# Patient Record
Sex: FEMALE | Race: WHITE | Employment: FULL TIME | URBAN - METROPOLITAN AREA
[De-identification: names, ages, dates, MRNs, and addresses within clinical notes are randomized per-mention and may not be internally consistent; named-entity substitution may affect disease eponyms.]

---

## 2017-02-03 PROBLEM — H69.83 ETD (EUSTACHIAN TUBE DYSFUNCTION), BILATERAL: Status: ACTIVE | Noted: 2017-02-03

## 2017-02-03 PROBLEM — B02.9 HERPES ZOSTER WITHOUT COMPLICATION: Status: ACTIVE | Noted: 2017-02-03

## 2017-02-03 PROBLEM — H69.93 ETD (EUSTACHIAN TUBE DYSFUNCTION), BILATERAL: Status: ACTIVE | Noted: 2017-02-03

## 2017-08-28 ENCOUNTER — HOSPITAL ENCOUNTER (OUTPATIENT)
Dept: MAMMOGRAPHY | Age: 57
Discharge: HOME | End: 2017-08-28
Attending: OBSTETRICS & GYNECOLOGY
Payer: COMMERCIAL

## 2017-08-28 DIAGNOSIS — Z12.39 ENCOUNTER FOR SCREENING FOR MALIGNANT NEOPLASM OF BREAST: ICD-10-CM

## 2017-08-28 PROCEDURE — 77067 SCR MAMMO BI INCL CAD: CPT | Performed by: OBSTETRICS & GYNECOLOGY

## 2019-01-05 ENCOUNTER — HOSPITAL ENCOUNTER (OUTPATIENT)
Dept: MAMMOGRAPHY | Age: 59
Discharge: HOME | End: 2019-01-05
Attending: OBSTETRICS & GYNECOLOGY
Payer: COMMERCIAL

## 2019-01-05 DIAGNOSIS — Z13.9 VISIT FOR SCREENING: ICD-10-CM

## 2019-01-05 PROCEDURE — 77063 BREAST TOMOSYNTHESIS BI: CPT | Performed by: OBSTETRICS & GYNECOLOGY

## 2019-01-05 PROCEDURE — 77067 SCR MAMMO BI INCL CAD: CPT | Performed by: OBSTETRICS & GYNECOLOGY

## 2020-02-27 ENCOUNTER — HOSPITAL ENCOUNTER (OUTPATIENT)
Dept: MAMMOGRAPHY | Age: 60
Discharge: HOME | End: 2020-02-27
Attending: OBSTETRICS & GYNECOLOGY
Payer: COMMERCIAL

## 2020-02-27 DIAGNOSIS — Z12.31 ENCOUNTER FOR SCREENING MAMMOGRAM FOR MALIGNANT NEOPLASM OF BREAST: ICD-10-CM

## 2020-02-27 PROCEDURE — 77067 SCR MAMMO BI INCL CAD: CPT | Performed by: OBSTETRICS & GYNECOLOGY

## 2020-02-27 PROCEDURE — 77063 BREAST TOMOSYNTHESIS BI: CPT | Performed by: OBSTETRICS & GYNECOLOGY

## 2020-10-26 PROBLEM — M85.80 OSTEOPENIA, UNSPECIFIED LOCATION: Status: ACTIVE | Noted: 2020-10-26

## 2021-03-30 ENCOUNTER — HOSPITAL ENCOUNTER (OUTPATIENT)
Dept: MAMMOGRAPHY | Age: 61
Discharge: HOME | End: 2021-03-30
Attending: PHYSICIAN ASSISTANT
Payer: COMMERCIAL

## 2021-03-30 DIAGNOSIS — Z12.31 ENCOUNTER FOR SCREENING MAMMOGRAM FOR MALIGNANT NEOPLASM OF BREAST: ICD-10-CM

## 2021-03-30 PROCEDURE — 77067 SCR MAMMO BI INCL CAD: CPT | Performed by: PHYSICIAN ASSISTANT

## 2021-03-30 PROCEDURE — 77063 BREAST TOMOSYNTHESIS BI: CPT | Performed by: PHYSICIAN ASSISTANT

## 2022-12-28 ENCOUNTER — OFFICE VISIT (OUTPATIENT)
Dept: INTERNAL MEDICINE CLINIC | Facility: CLINIC | Age: 62
End: 2022-12-28
Payer: COMMERCIAL

## 2022-12-28 VITALS
SYSTOLIC BLOOD PRESSURE: 152 MMHG | TEMPERATURE: 99 F | DIASTOLIC BLOOD PRESSURE: 88 MMHG | WEIGHT: 166 LBS | BODY MASS INDEX: 28.34 KG/M2 | HEART RATE: 68 BPM | HEIGHT: 64 IN

## 2022-12-28 DIAGNOSIS — I10 PRIMARY HYPERTENSION: ICD-10-CM

## 2022-12-28 DIAGNOSIS — M85.80 OSTEOPENIA, UNSPECIFIED LOCATION: ICD-10-CM

## 2022-12-28 DIAGNOSIS — Z00.00 WELLNESS EXAMINATION: Primary | ICD-10-CM

## 2022-12-28 DIAGNOSIS — Z12.11 SCREEN FOR COLON CANCER: ICD-10-CM

## 2022-12-28 PROCEDURE — 3008F BODY MASS INDEX DOCD: CPT | Performed by: INTERNAL MEDICINE

## 2022-12-28 PROCEDURE — 3077F SYST BP >= 140 MM HG: CPT | Performed by: INTERNAL MEDICINE

## 2022-12-28 PROCEDURE — 99386 PREV VISIT NEW AGE 40-64: CPT | Performed by: INTERNAL MEDICINE

## 2022-12-28 PROCEDURE — 3079F DIAST BP 80-89 MM HG: CPT | Performed by: INTERNAL MEDICINE

## 2023-03-30 PROBLEM — Z80.0 FAMILY HISTORY OF COLON CANCER: Status: ACTIVE | Noted: 2023-03-30

## 2023-03-30 PROBLEM — Z83.719 FAMILY HISTORY OF COLONIC POLYPS: Status: ACTIVE | Noted: 2023-03-30

## 2023-03-30 PROBLEM — Z83.71 FAMILY HISTORY OF COLONIC POLYPS: Status: ACTIVE | Noted: 2023-03-30

## 2023-03-30 PROBLEM — Z12.11 SPECIAL SCREENING FOR MALIGNANT NEOPLASMS, COLON: Status: ACTIVE | Noted: 2023-03-30

## 2023-03-30 PROBLEM — K57.30 COLON, DIVERTICULOSIS: Status: ACTIVE | Noted: 2023-03-30

## 2023-03-30 PROBLEM — K64.8 INTERNAL HEMORRHOIDS: Status: ACTIVE | Noted: 2023-03-30

## 2023-03-30 PROBLEM — K63.5 COLON POLYP: Status: ACTIVE | Noted: 2023-03-30

## 2023-03-30 PROBLEM — D12.2 BENIGN NEOPLASM OF ASCENDING COLON: Status: ACTIVE | Noted: 2023-03-30

## 2023-08-15 ENCOUNTER — HOSPITAL ENCOUNTER (OUTPATIENT)
Dept: MAMMOGRAPHY | Age: 63
Discharge: HOME | End: 2023-08-15
Attending: PHYSICIAN ASSISTANT
Payer: COMMERCIAL

## 2023-08-15 DIAGNOSIS — Z12.31 ENCOUNTER FOR SCREENING MAMMOGRAM FOR MALIGNANT NEOPLASM OF BREAST: ICD-10-CM

## 2023-08-15 PROCEDURE — 77067 SCR MAMMO BI INCL CAD: CPT | Performed by: PHYSICIAN ASSISTANT

## 2023-08-15 PROCEDURE — 77063 BREAST TOMOSYNTHESIS BI: CPT | Performed by: PHYSICIAN ASSISTANT

## 2024-03-14 ENCOUNTER — TELEPHONE (OUTPATIENT)
Dept: INTERNAL MEDICINE CLINIC | Facility: CLINIC | Age: 64
End: 2024-03-14

## 2024-03-14 DIAGNOSIS — Z00.00 WELLNESS EXAMINATION: Primary | ICD-10-CM

## 2024-03-14 DIAGNOSIS — Z13.220 SCREENING FOR LIPID DISORDERS: ICD-10-CM

## 2024-03-14 DIAGNOSIS — Z13.29 SCREENING FOR THYROID DISORDER: ICD-10-CM

## 2024-03-14 DIAGNOSIS — Z13.0 SCREENING FOR IRON DEFICIENCY ANEMIA: ICD-10-CM

## 2024-03-14 DIAGNOSIS — Z13.228 ENCOUNTER FOR SCREENING FOR METABOLIC DISORDER: ICD-10-CM

## 2024-03-19 ENCOUNTER — OFFICE VISIT (OUTPATIENT)
Dept: INTERNAL MEDICINE CLINIC | Facility: CLINIC | Age: 64
End: 2024-03-19
Payer: COMMERCIAL

## 2024-03-19 VITALS
DIASTOLIC BLOOD PRESSURE: 76 MMHG | WEIGHT: 147.63 LBS | HEIGHT: 64 IN | BODY MASS INDEX: 25.2 KG/M2 | OXYGEN SATURATION: 99 % | RESPIRATION RATE: 16 BRPM | HEART RATE: 76 BPM | TEMPERATURE: 98 F | SYSTOLIC BLOOD PRESSURE: 128 MMHG

## 2024-03-19 DIAGNOSIS — K63.5 POLYP OF COLON, UNSPECIFIED PART OF COLON, UNSPECIFIED TYPE: ICD-10-CM

## 2024-03-19 DIAGNOSIS — Z00.00 WELLNESS EXAMINATION: Primary | ICD-10-CM

## 2024-03-19 DIAGNOSIS — Z12.31 ENCOUNTER FOR SCREENING MAMMOGRAM FOR MALIGNANT NEOPLASM OF BREAST: ICD-10-CM

## 2024-03-19 DIAGNOSIS — M85.89 OSTEOPENIA OF MULTIPLE SITES: ICD-10-CM

## 2024-03-19 DIAGNOSIS — A60.00 GENITAL HERPES SIMPLEX, UNSPECIFIED SITE: ICD-10-CM

## 2024-03-19 PROBLEM — H69.93 ETD (EUSTACHIAN TUBE DYSFUNCTION), BILATERAL: Status: RESOLVED | Noted: 2017-02-03 | Resolved: 2024-03-19

## 2024-03-19 PROBLEM — Z83.719 FAMILY HISTORY OF COLONIC POLYPS: Status: RESOLVED | Noted: 2023-03-30 | Resolved: 2024-03-19

## 2024-03-19 PROBLEM — Z12.11 SPECIAL SCREENING FOR MALIGNANT NEOPLASMS, COLON: Status: RESOLVED | Noted: 2023-03-30 | Resolved: 2024-03-19

## 2024-03-19 PROCEDURE — 99396 PREV VISIT EST AGE 40-64: CPT | Performed by: INTERNAL MEDICINE

## 2024-03-19 RX ORDER — VALACYCLOVIR HYDROCHLORIDE 1 G/1
1000 TABLET, FILM COATED ORAL DAILY
Qty: 5 TABLET | Refills: 2 | Status: SHIPPED | OUTPATIENT
Start: 2024-03-19 | End: 2024-03-19

## 2024-03-19 RX ORDER — VALACYCLOVIR HYDROCHLORIDE 1 G/1
1000 TABLET, FILM COATED ORAL DAILY
Qty: 5 TABLET | Refills: 2 | Status: SHIPPED | OUTPATIENT
Start: 2024-03-19 | End: 2024-04-03

## 2024-03-19 RX ORDER — VALACYCLOVIR HYDROCHLORIDE 1 G/1
1000 TABLET, FILM COATED ORAL 3 TIMES DAILY
Qty: 21 TABLET | Refills: 2 | Status: SHIPPED | OUTPATIENT
Start: 2024-03-19 | End: 2024-03-19

## 2024-03-19 NOTE — PROGRESS NOTES
Magdalena Rose  3/9/1960    No chief complaint on file.      SUBJECTIVE   Magdalena Rose is a 64 year old female who presents for CPE. She is doing well and has no acute complaints today.     She has a pruritic skin tag near right axilla.    Review of Systems   Review of Systems   No f/c/chest pain or sob. No cough. No abd pain/n/v/d. No ha or dizziness. No numbness, tingling, or weakness. No other complaints today.    OBJECTIVE:   /76   Pulse 76   Temp 97.6 °F (36.4 °C)   Resp 16   Ht 5' 4\" (1.626 m)   Wt 147 lb 9.6 oz (67 kg)   LMP 08/05/2013   SpO2 99%   BMI 25.34 kg/m²   Physical Exam   Constitutional: Oriented to person, place, and time. No distress.   HEENT:  Normocephalic and atraumatic.TM's wnl. Oropharynx is clear and moist.   Eyes: Conjunctivae wnl.  Extraocular movements are intact  Neck: Full ROM.  Neck supple.  No thyromegaly  Cardiovascular: Normal rate, regular rhythm and intact distal pulses.  No murmur, rubs or gallops.   Pulmonary/Chest: Effort normal and breath sounds normal. No respiratory distress.  Abdominal: Soft. Bowel sounds are normal. Non tender, no masses, no organomegaly or hernias.  Musculoskeletal: No edema in bilateral lower extremities.  Strength is 5/5 in bilateral upper and lower extremities.  Lymphadenopathy: No cervical adenopathy.   Neurological: No focal neurologic deficits.  Cranial nerves II through XII are intact.    Skin: Skin is warm and dry. No rash on visualized skin.  Psychiatric: Normal mood and affect.       Lab Results   Component Value Date     02/01/2022    BUN 9.0 02/01/2022    CREATSERUM 0.95 02/01/2022    BUNCREA 11.0 02/19/2021    CA 9.2 02/19/2021     02/01/2022    K 4.8 02/01/2022     02/01/2022    CO2 21.0 (A) 02/01/2022      Lab Results   Component Value Date    WBC 6.00 02/01/2022    RBC 4.41 02/01/2022    HGB 13.5 02/01/2022    HCT 41.8 02/01/2022    MCV 95.2 07/22/2016    MCH 30.5 07/22/2016    MCHC 32.1 07/22/2016    RDW  13.8 07/22/2016     02/01/2022      Lab Results   Component Value Date    TSH 1.590 07/22/2016        ASSESSMENT AND PLAN:       ICD-10-CM    1. Wellness examination  Z00.00       2. Encounter for screening mammogram for malignant neoplasm of breast  Z12.31 [facility] NAVYA 2D+3D SCREENING BILAT (CPT=77067/02104)      3. Osteopenia of multiple sites  M85.89 XR DEXA BONE DENSITOMETRY (CPT=77080)   Ob/Gyne ordered DEXA in 2014? There was evidence of osteopenia. Will repeat this year. Recommended cont weight-bearing exercise and Ca/Vit D from diet for now.   4. Polyp of colon, unspecified part of colon, unspecified type  K63.5 Adenomatous polyp x 3 in asc colon and hyperplastic polyp x 1 in sigmoid colon. Repeat in 3 years per [facility].      6. Genital herpes simplex, unspecified site  A60.00 valACYclovir 1 G Oral Tab   She has been Rx Valacyclovir for shingles and genital herpes in the past.       The patient indicates understanding of these issues and agrees to the plan.  The patient is asked to return or present to the emergency room for worsening of symptoms.    TODAY'S ORDERS     No orders of the defined types were placed in this encounter.      Meds & Refills:  Requested Prescriptions      No prescriptions requested or ordered in this encounter       Imaging & Consults:  None    No follow-ups on file.  There are no Patient Instructions on file for this visit.    All questions were answered and the patient agrees with the plan.     Thank you,  Maria Teresa Aly, DO

## 2024-06-18 DIAGNOSIS — A60.00 GENITAL HERPES SIMPLEX, UNSPECIFIED SITE: ICD-10-CM

## 2024-06-20 RX ORDER — VALACYCLOVIR HYDROCHLORIDE 1 G/1
TABLET, FILM COATED ORAL
Qty: 15 TABLET | Refills: 1 | Status: SHIPPED | OUTPATIENT
Start: 2024-06-20

## 2024-06-20 NOTE — TELEPHONE ENCOUNTER
Refill passed per [facility] protocol.  Requested Prescriptions   Pending Prescriptions Disp Refills    VALACYCLOVIR 1 G Oral Tab [Pharmacy Med Name: VALACYCLOVIR 1GM TABLETS] 15 tablet 0     Sig: TAKE 1 TABLET BY MOUTH DAILY FOR 5 DAYS AS NEEDED FOR FLARES       Herpes Agent Protocol Passed - 6/18/2024 12:53 PM        Passed - In person appointment or virtual visit in the past 12 mos or appointment in next 3 mos     Recent Outpatient Visits              3 months ago Wellness examination    [facility], [address] [place], [place] [address]Maria Teresa Gonzales, DO    Office Visit    6 months ago Neoplasm of uncertain behavior of skin    FRAN REYNOLDS Scott, MD    Office Visit    1 year ago Wellness examination    [address]    Office Visit    2 years ago Newly recognized heart murmur    [facility] - [address], Giancarlo Russ PA    Office Visit    3 years ago Screening for cervical cancer    [facility] - [address], Giancarlo Russ PA    Office Visit          Future Appointments         Provider Department Appt Notes    In 1 month [address] Mammography - [address]                        Future Appointments         Provider Department Appt Notes    In 1 month [address] Mamm[address] - [address]           Recent Outpatient Visits              3 months ago Wellness examination    [facility], [address] [place], [place] [address]Maria Teresa Gonzales, DO    Office Visit    6 months ago Neoplasm of uncertain behavior of skin    ANNY MAYES, Oscar Lopez MD    Office Visit    1 year ago Wellness examination    [address]    Office Visit    2 years ago Newly recognized heart murmur    [facility] - [address], Giancarlo Russ PA    Office  Visit    3 years ago Screening for cervical cancer    Family [facility] - [address], Arslan Bhagat, ROMULO Mondragon    Office Visit

## 2024-08-16 ENCOUNTER — HOSPITAL ENCOUNTER (OUTPATIENT)
Dept: MAMMOGRAPHY | Age: 64
Discharge: HOME | End: 2024-08-16
Attending: INTERNAL MEDICINE
Payer: COMMERCIAL

## 2024-08-16 DIAGNOSIS — Z12.31 ENCOUNTER FOR SCREENING MAMMOGRAM FOR MALIGNANT NEOPLASM OF BREAST: ICD-10-CM

## 2024-08-16 PROCEDURE — 77063 BREAST TOMOSYNTHESIS BI: CPT | Performed by: INTERNAL MEDICINE

## 2024-08-16 PROCEDURE — 77067 SCR MAMMO BI INCL CAD: CPT | Performed by: INTERNAL MEDICINE

## 2024-08-24 ENCOUNTER — HOSPITAL ENCOUNTER (OUTPATIENT)
Dept: BONE DENSITY | Age: 64
Discharge: HOME | End: 2024-08-24
Attending: INTERNAL MEDICINE
Payer: COMMERCIAL

## 2024-08-24 DIAGNOSIS — M85.89 OSTEOPENIA OF MULTIPLE SITES: ICD-10-CM

## 2024-08-24 PROCEDURE — 77080 DXA BONE DENSITY AXIAL: CPT | Performed by: INTERNAL MEDICINE

## 2024-08-25 DIAGNOSIS — E55.9 VITAMIN D DEFICIENCY: Primary | ICD-10-CM

## 2024-08-25 DIAGNOSIS — M85.89 OSTEOPENIA OF MULTIPLE SITES: ICD-10-CM

## 2024-09-03 DIAGNOSIS — A60.00 GENITAL HERPES SIMPLEX, UNSPECIFIED SITE: ICD-10-CM

## 2024-09-04 ENCOUNTER — HOSPITAL ENCOUNTER (OUTPATIENT)
Dept: MAMMOGRAPHY | Facility: HOSPITAL | Age: 64
Discharge: HOME | End: 2024-09-04
Attending: INTERNAL MEDICINE
Payer: COMMERCIAL

## 2024-09-04 DIAGNOSIS — R92.2 INCONCLUSIVE MAMMOGRAM: ICD-10-CM

## 2024-09-04 PROCEDURE — 76642 ULTRASOUND BREAST LIMITED: CPT | Performed by: INTERNAL MEDICINE

## 2024-09-04 PROCEDURE — 77061 BREAST TOMOSYNTHESIS UNI: CPT | Performed by: INTERNAL MEDICINE

## 2024-09-04 PROCEDURE — 77065 DX MAMMO INCL CAD UNI: CPT | Performed by: INTERNAL MEDICINE

## 2024-09-04 NOTE — IMAGING NOTE
This Breast Care RN assisted Dr. Lynne with recommendation for a left breast 2 site ultrasound guided biopsy for mass. Procedure reviewed and all questions answered. Emotional and educational support given. On the day of the biopsy, pt instructed to take Tylenol 1000mg PO, eat a light meal & bring or wear a sports bra.  Post biopsy care also reviewed with pt to include NO lifting more than 5lbs, no exercising or housework (limit upper body movement) for 24-48 hrs post biopsy. Patient denies blood thinners, bleeding disorders, liver disease, and chemo. Pt verbalized understanding. Our breast center schedulers will be calling to schedule an appt that is convenient for pt.

## 2024-09-05 ENCOUNTER — TELEPHONE (OUTPATIENT)
Dept: INTERNAL MEDICINE CLINIC | Facility: CLINIC | Age: 64
End: 2024-09-05

## 2024-09-05 NOTE — TELEPHONE ENCOUNTER
See Mammogram Additional views Left Breast and U/S Left Breast.  Results:  Recommend a 2 site left ultrasound guided breast biopsy of a 1cm hypoechoic mass at the 2:30 position left breast and 6 mm hypoechoic nodule at the 3 o'clock position left breast.    Paperwork in DR SHANIA MASON inbox for review and orders.

## 2024-09-05 NOTE — TELEPHONE ENCOUNTER
Faxed completed form with signature to  Mammogram Dept.  Confirmation received and sent to scan.   Hunter Barber

## 2024-09-05 NOTE — TELEPHONE ENCOUNTER
Please review. Protocol Pass    Please see patients message:        valACYclovir 1 G Oral Tab [Maria Teresa Aly]      Patient Comment: I am having a Shingles recurrence.  I believe I am supposed to take 3x day for 7 days, correct?    Requested Prescriptions   Pending Prescriptions Disp Refills    valACYclovir 1 G Oral Tab 15 tablet 1     Sig: TAKE 1 TABLET BY MOUTH DAILY FOR 5 DAYS AS NEEDED FOR FLARES       Herpes Agent Protocol Passed - 9/3/2024 11:53 AM        Passed - In person appointment or virtual visit in the past 12 mos or appointment in next 3 mos     Recent Outpatient Visits              5 months ago Wellness examination    [facility], [address] [place] [address], [place] Jermain Maria Teresa Kingstona, DO    Office Visit    9 months ago Neoplasm of uncertain behavior of skin    FRAN REYNOLDS Scott, MD    Office Visit    1 year ago Wellness examination    [facility], [address] [place] [address], Arslan Aly Graciededra Dash, DO    Office Visit    2 years ago Newly recognized heart murmur    [facility] - [address], Giancarlo Russ PA    Office Visit    3 years ago Screening for cervical cancer    [facility] - [address], Giancarlo Russ PA    Office Visit          Future Appointments         Provider Department Appt Notes    Tomorrow [facility] BREAST [facility] Mammography QA TB  2 site left                           Future Appointments         Provider Department Appt Notes    Tomorrow [facility] BREAST BX [facility] Mammography QA TB  2 site left          Recent Outpatient Visits              5 months ago Wellness examination    [facility], [address] [place] [address], OrlandoMaria Teresa Gonzales, DO    Office Visit    9 months ago Neoplasm of uncertain behavior of skin    FRAN REYNOLDS Scott, MD    Office Visit    1 year ago Wellness examination    [address],  Maria Teresa Hyatt DO    Office Visit    2 years ago Newly recognized heart murmur    [facility] - [address], Giancarlo Russ PA    Office Visit    3 years ago Screening for cervical cancer    [facility] - [address] [facility], Giancarlo Russ, PA    Office Visit

## 2024-09-06 ENCOUNTER — HOSPITAL ENCOUNTER (OUTPATIENT)
Dept: MAMMOGRAPHY | Facility: HOSPITAL | Age: 64
Discharge: HOME | End: 2024-09-06
Attending: INTERNAL MEDICINE
Payer: COMMERCIAL

## 2024-09-06 DIAGNOSIS — N63.0 BREAST NODULE: ICD-10-CM

## 2024-09-06 PROCEDURE — 88342 IMHCHEM/IMCYTCHM 1ST ANTB: CPT | Performed by: INTERNAL MEDICINE

## 2024-09-06 PROCEDURE — 77065 DX MAMMO INCL CAD UNI: CPT | Performed by: INTERNAL MEDICINE

## 2024-09-06 PROCEDURE — 88344 IMHCHEM/IMCYTCHM EA MLT ANTB: CPT | Performed by: INTERNAL MEDICINE

## 2024-09-06 PROCEDURE — 19084 BX BREAST ADD LESION US IMAG: CPT | Performed by: INTERNAL MEDICINE

## 2024-09-06 PROCEDURE — 88341 IMHCHEM/IMCYTCHM EA ADD ANTB: CPT | Performed by: INTERNAL MEDICINE

## 2024-09-06 PROCEDURE — 88305 TISSUE EXAM BY PATHOLOGIST: CPT | Performed by: INTERNAL MEDICINE

## 2024-09-06 PROCEDURE — 88360 TUMOR IMMUNOHISTOCHEM/MANUAL: CPT | Performed by: INTERNAL MEDICINE

## 2024-09-06 PROCEDURE — 19083 BX BREAST 1ST LESION US IMAG: CPT | Performed by: INTERNAL MEDICINE

## 2024-09-06 RX ORDER — VALACYCLOVIR HYDROCHLORIDE 1 G/1
1000 TABLET, FILM COATED ORAL 3 TIMES DAILY
Qty: 21 TABLET | Refills: 0 | Status: SHIPPED | OUTPATIENT
Start: 2024-09-06 | End: 2024-09-13

## 2024-09-06 NOTE — TELEPHONE ENCOUNTER
I have Rx for treatment of Shingles as requested. Is this shingles (herpes zoster/varicella virus) or herpes simplex virus, which can come and go like cold sores?

## 2024-09-06 NOTE — DISCHARGE INSTRUCTIONS
Discharge Instructions  Stereotactic / Ultrasound / MRI Core Biopsy     Place an ice pack on top of the biopsy site in your bra OR the folds of the Ace wrap for 10-15 minutes every hour until bedtime for your comfort and to decrease bleeding.     Keep your supportive bra OR the Ace wrap in place for 24 hours after your biopsy. This compression decreases bleeding and breast movement for your comfort. Wear a supportive bra for the next couple days for comfort (as well as for sleeping)     No bath or shower during the first 24 hours after biopsy. After this time, you may take a bath or shower. It’s okay if the steri-strips get wet but don’t soak them.   No saunas, hot tubs, or swimming pools until the steri-strips fall off (5-7days).  This prevents infection and allows time for the area to completely close and heal.     DO NOT remove the steri-strips. They will fall off in 5 days to two weeks. If any type of irritation (redness, itching, OR blisters) develops in the area around the steri-strips, remove them gently. Keep the area clean and dry.     It is normal to have mild discomfort and bruising at the biopsy site.      You may take Tylenol as needed for discomfort.     DO NOT participate in strenuous activity (aerobics, heavy lifting, housework, gardening, etc.) 48 hours after your biopsy to prevent bleeding.     You will receive results typically within 2-3 business days. Occasionally, the specimen is sent off-site for a 2nd opinion. You will be notified when this occurs as this will delay your results.     If you have any questions about the procedure or your results, please contact the Breast Care Coordinator Nurse at [phone]. (M-F 7:30-4)    If you are having a MRI Breast Biopsy or an Ultrasound guided biopsy, you will be billed for the biopsy and a unilateral mammogram separately.    A 6-month or one year follow-up Diagnostic Mammogram/Ultrasound will be recommended to document stability of the biopsy  site. It may be scheduled at [facility] or [facility] by calling [phone]. You will need an order for this exam from your referring physician.       5/2024

## 2024-09-09 NOTE — TELEPHONE ENCOUNTER
Pt returned call. Pt stated she already spoke with MP regarding this and med has been refilled. Pt stated she takes med for both shingles and for outbreak. Stated right now is currently for shingles. Med was already refill. No further questions    TRISH GOETZ

## 2024-09-10 ENCOUNTER — TELEPHONE (OUTPATIENT)
Dept: MAMMOGRAPHY | Facility: HOSPITAL | Age: 64
End: 2024-09-10

## 2024-09-10 NOTE — TELEPHONE ENCOUNTER
Telephoned Magdalena Rose and name,  verified with patient. Notified Magdalena Rose of left breast 1 site positive for IDC and DCIS and left breast another site positive for ADH biopsy result. Concordance pending. Magdalena Rose reports biopsy site is healing well. Radiologist recommends surgical consultation. Dr Aly's office is referring patient to Dr Galeana. Patient was provided with the contact information for Dr Galeana, the Breast Program Navigators, and myself. Patient accepted an appt with Dr Galeana on 24 at 12 pm. Pt verbalized understanding and had no further questions at this time.

## 2024-09-11 ENCOUNTER — TELEPHONE (OUTPATIENT)
Dept: HEMATOLOGY/ONCOLOGY | Facility: HOSPITAL | Age: 64
End: 2024-09-11

## 2024-09-11 NOTE — TELEPHONE ENCOUNTER
Phoned patient and we discussed newly diagnosed breast cancer. Introduced myself as one of the breast nurse navigators and explained the role of the breast nurse navigator. Explained the role of the physicians on her breast cancer care team. Reviewed over pathology report and discussed the type of breast cancer, grade, and ER/CO and Her 2. Patient's tumor markers pending. Briefly discussed breast cancer treatments including surgery, radiation, hormone therapy, and chemotherapy. Explained the breast cancer multidisciplinary meeting and that her case will be discussed. Patient is scheduled with Dr. Galeana on September 24, 2024. Patient given my contact information to call with any further questions or concerns.

## 2024-09-16 ENCOUNTER — TELEPHONE (OUTPATIENT)
Dept: HEMATOLOGY/ONCOLOGY | Facility: HOSPITAL | Age: 64
End: 2024-09-16

## 2024-09-16 NOTE — TELEPHONE ENCOUNTER
LMOVMTCB in regards to notifying the patient that she tumor markers came back from her breast biopsy and to call back to discuss.     Awaiting phone call back from patient.

## 2024-09-24 ENCOUNTER — TELEPHONE (OUTPATIENT)
Dept: INTERNAL MEDICINE CLINIC | Facility: CLINIC | Age: 64
End: 2024-09-24

## 2024-09-24 DIAGNOSIS — Z01.419 ENCOUNTER FOR GYNECOLOGICAL EXAMINATION: Primary | ICD-10-CM

## 2024-09-24 NOTE — TELEPHONE ENCOUNTER
There is only 1 pap smear available for review in the chart from 2021.     If she has had normal results, then pap/HPV co-testing is done ever 5 years.

## 2024-09-24 NOTE — TELEPHONE ENCOUNTER
Patient asking when pap smear is due.  Per care gaps- repeat pap in 5 years (3/29/26)   Patient thought it was supposed to be every 3 years until she turns 66    Please advise

## 2024-09-26 NOTE — TELEPHONE ENCOUNTER
Ok! Can do here but given her history may be better to have her see Gyne? If she prefers seeing specialist, okay to refer.

## 2024-09-26 NOTE — TELEPHONE ENCOUNTER
MCM not read.     Called and spoke w/ pt. Pt stated that she is nervous about waiting 5 years. Stated she has had previous paps that are positive and about 20 years ago was having paps 2x year. Pt stated she will check with her insurance. Pt stated she has also had 2 LEEP surgeries.     TRISH MP - Will check with her insurance however interested in having one sooner rather than waiting

## 2024-11-26 ENCOUNTER — TELEPHONE (OUTPATIENT)
Dept: INTERNAL MEDICINE CLINIC | Facility: CLINIC | Age: 64
End: 2024-11-26

## 2024-11-26 ENCOUNTER — PATIENT MESSAGE (OUTPATIENT)
Dept: INTERNAL MEDICINE CLINIC | Facility: CLINIC | Age: 64
End: 2024-11-26

## 2024-11-26 NOTE — TELEPHONE ENCOUNTER
Pt is calling regarding her bill. Pt stated that her billing codes are enter in wrong and she received a bill. Pt wants to talk to a manager today.    Pt was holding for 10mins and I did let the Pt know that I couldn't I have her on hold for much longer and I had to discontinue the call. I did ask PSR Lead what should I do regarding this  because the Pt said that she is going to complain to corporate. Pt said to me are you going to hang up on me and I did let her know that, I have sent a message to the manager regarding her bill.

## 2024-11-26 NOTE — TELEPHONE ENCOUNTER
Spoke with patient and explained the Dexa scan done on 8/24/24 was coded using Osteopenia due to having Osteopenia as documented in her 8/19/24 Dexa result.    Patient understood.

## 2024-12-17 ENCOUNTER — PATIENT MESSAGE (OUTPATIENT)
Dept: INTERNAL MEDICINE CLINIC | Facility: CLINIC | Age: 64
End: 2024-12-17

## 2024-12-17 DIAGNOSIS — E55.9 VITAMIN D INSUFFICIENCY: Primary | ICD-10-CM

## 2024-12-17 LAB
ABSOLUTE BASOPHILS: 32 CELLS/UL (ref 0–200)
ABSOLUTE EOSINOPHILS: 162 CELLS/UL (ref 15–500)
ABSOLUTE LYMPHOCYTES: 950 CELLS/UL (ref 850–3900)
ABSOLUTE MONOCYTES: 495 CELLS/UL (ref 200–950)
ABSOLUTE NEUTROPHILS: 2862 CELLS/UL (ref 1500–7800)
ALBUMIN/GLOBULIN RATIO: 1.9 (CALC) (ref 1–2.5)
ALBUMIN: 4.1 G/DL (ref 3.6–5.1)
ALKALINE PHOSPHATASE: 66 U/L (ref 37–153)
ALT: 14 U/L (ref 6–29)
AST: 16 U/L (ref 10–35)
BASOPHILS: 0.7 %
BILIRUBIN, TOTAL: 0.6 MG/DL (ref 0.2–1.2)
BUN: 13 MG/DL (ref 7–25)
CALCIUM: 8.9 MG/DL (ref 8.6–10.4)
CARBON DIOXIDE: 25 MMOL/L (ref 20–32)
CHLORIDE: 104 MMOL/L (ref 98–110)
CHOL/HDLC RATIO: 2.8 (CALC)
CHOLESTEROL, TOTAL: 156 MG/DL
CREATININE: 0.82 MG/DL (ref 0.5–1.05)
EGFR: 80 ML/MIN/1.73M2
EOSINOPHILS: 3.6 %
GLOBULIN: 2.2 G/DL (CALC) (ref 1.9–3.7)
GLUCOSE: 90 MG/DL (ref 65–99)
HDL CHOLESTEROL: 56 MG/DL
HEMATOCRIT: 41 % (ref 35–45)
HEMOGLOBIN: 12.8 G/DL (ref 11.7–15.5)
LDL-CHOLESTEROL: 87 MG/DL (CALC)
LYMPHOCYTES: 21.1 %
MCH: 30.5 PG (ref 27–33)
MCHC: 31.2 G/DL (ref 32–36)
MCV: 97.6 FL (ref 80–100)
MONOCYTES: 11 %
MPV: 10.5 FL (ref 7.5–12.5)
NEUTROPHILS: 63.6 %
NON-HDL CHOLESTEROL: 100 MG/DL (CALC)
PLATELET COUNT: 226 THOUSAND/UL (ref 140–400)
POTASSIUM: 4.2 MMOL/L (ref 3.5–5.3)
PROTEIN, TOTAL: 6.3 G/DL (ref 6.1–8.1)
RDW: 12.9 % (ref 11–15)
RED BLOOD CELL COUNT: 4.2 MILLION/UL (ref 3.8–5.1)
SODIUM: 137 MMOL/L (ref 135–146)
TRIGLYCERIDES: 43 MG/DL
TSH W/REFLEX TO FT4: 1.46 MIU/L (ref 0.4–4.5)
VITAMIN D, 25-OH, TOTAL: 29 NG/ML (ref 30–100)
WHITE BLOOD CELL COUNT: 4.5 THOUSAND/UL (ref 3.8–10.8)

## 2024-12-17 RX ORDER — ERGOCALCIFEROL 1.25 MG/1
50000 CAPSULE, LIQUID FILLED ORAL WEEKLY
Qty: 6 CAPSULE | Refills: 0 | Status: SHIPPED | OUTPATIENT
Start: 2024-12-17 | End: 2025-01-22

## 2024-12-17 NOTE — TELEPHONE ENCOUNTER
We can do a few doses of the Rx Ergocalciferol. Her level is 29 and 30 is considered the normal range. We can do a few weeks of high dose to get that number up and then 3230-8316 units daily should be good

## 2025-01-21 ENCOUNTER — PATIENT MESSAGE (OUTPATIENT)
Dept: INTERNAL MEDICINE CLINIC | Facility: CLINIC | Age: 65
End: 2025-01-21

## 2025-01-21 DIAGNOSIS — A60.00 GENITAL HERPES SIMPLEX, UNSPECIFIED SITE: ICD-10-CM

## 2025-01-22 RX ORDER — VALACYCLOVIR HYDROCHLORIDE 500 MG/1
500 TABLET, FILM COATED ORAL EVERY 12 HOURS SCHEDULED
Qty: 24 TABLET | Refills: 0 | Status: SHIPPED | OUTPATIENT
Start: 2025-01-22 | End: 2025-01-25

## 2025-01-22 NOTE — TELEPHONE ENCOUNTER
Please review.  Protocol failed / Has no protocol.    Last written by Dr. Aly 9/6/24, but prescription is not active on medication list     Requested Prescriptions   Pending Prescriptions Disp Refills    valACYclovir 1 G Oral Tab 21 tablet 1     Sig: Take 1 tablet (1,000 mg total) by mouth in the morning, at noon, and at bedtime for 7 days.       Herpes Agent Protocol Failed - 1/22/2025 10:47 AM        Failed - Medication is active on med list        Passed - In person appointment or virtual visit in the past 12 mos or appointment in next 3 mos     Recent Outpatient Visits              10 months ago Wellness examination    [facility], [address] [place] [address] Maria Teresa Aly, DO    Office Visit    1 year ago Neoplasm of uncertain behavior of skin    ANNY MAYES, Oscar Lopez MD    Office Visit    2 years ago Wellness examination    [address]    Office Visit    2 years ago Newly recognized heart murmur    [facility] - [address], Giancarlo Russ PA    Office Visit    3 years ago Screening for cervical cancer    [facility] - [address], Giancarlo Russ PA    Office Visit                           Recent Outpatient Visits              10 months ago Wellness examination    [facility], [address] [place] [address] Maria Teresa Aly, DO    Office Visit    1 year ago Neoplasm of uncertain behavior of skin    ANNY MAYES, Oscar Lopez MD    Office Visit    2 years ago Wellness examination    [address] Maria Teresa Aly, DO    Office Visit    2 years ago Newly recognized heart murmur    [facility] - [address], Giancarlo Russ PA    Office Visit    3 years ago Screening for cervical cancer    Family Medicine - [address], Giancarlo Russ PA    Office  Visit

## 2025-01-22 NOTE — TELEPHONE ENCOUNTER
Please triage for what she is treating.  This is the treatment dose for shingles for which patient should be evaluated before taking.  She does have Hx genital herpes but this is not the correct treatment.

## 2025-05-15 ENCOUNTER — TELEPHONE (OUTPATIENT)
Dept: INTERNAL MEDICINE CLINIC | Facility: CLINIC | Age: 65
End: 2025-05-15

## 2025-05-15 DIAGNOSIS — Z13.29 SCREENING FOR THYROID DISORDER: ICD-10-CM

## 2025-05-15 DIAGNOSIS — Z00.00 ROUTINE GENERAL MEDICAL EXAMINATION AT HEALTH CARE FACILITY: Primary | ICD-10-CM

## 2025-05-15 DIAGNOSIS — Z13.0 SCREENING FOR BLOOD DISEASE: ICD-10-CM

## 2025-05-15 DIAGNOSIS — Z13.220 SCREENING FOR LIPOID DISORDERS: ICD-10-CM

## 2025-05-15 DIAGNOSIS — Z13.228 SCREENING FOR METABOLIC DISORDER: ICD-10-CM

## 2025-05-15 NOTE — TELEPHONE ENCOUNTER
Patient called request labs prior to their annual physical.  Annual physical scheduled for 6/6/25   Please order labs. Patient preferred lab is [facility]  Patient informed request was sent to clinical team.  Patient informed to fast for labs.  No callback required.   Future Appointments   Date Time Provider Department Center   6/6/2025  9:00 AM Maria Teresa Aly DO [facility] [address] [facility]

## 2025-06-03 LAB
ABSOLUTE BASOPHILS: 21 CELLS/UL (ref 0–200)
ABSOLUTE EOSINOPHILS: 198 CELLS/UL (ref 15–500)
ABSOLUTE LYMPHOCYTES: 1279 CELLS/UL (ref 850–3900)
ABSOLUTE MONOCYTES: 577 CELLS/UL (ref 200–950)
ABSOLUTE NEUTROPHILS: 3125 CELLS/UL (ref 1500–7800)
ALBUMIN/GLOBULIN RATIO: 2 (CALC) (ref 1–2.5)
ALBUMIN: 4.3 G/DL (ref 3.6–5.1)
ALKALINE PHOSPHATASE: 72 U/L (ref 37–153)
ALT: 11 U/L (ref 6–29)
AST: 15 U/L (ref 10–35)
BASOPHILS: 0.4 %
BILIRUBIN, TOTAL: 0.7 MG/DL (ref 0.2–1.2)
BUN: 12 MG/DL (ref 7–25)
CALCIUM: 9.2 MG/DL (ref 8.6–10.4)
CARBON DIOXIDE: 28 MMOL/L (ref 20–32)
CHLORIDE: 102 MMOL/L (ref 98–110)
CHOL/HDLC RATIO: 3 (CALC)
CHOLESTEROL, TOTAL: 168 MG/DL
CREATININE: 0.84 MG/DL (ref 0.5–1.05)
EGFR: 78 ML/MIN/1.73M2
EOSINOPHILS: 3.8 %
GLOBULIN: 2.2 G/DL (CALC) (ref 1.9–3.7)
GLUCOSE: 99 MG/DL (ref 65–99)
HDL CHOLESTEROL: 56 MG/DL
HEMATOCRIT: 42.1 % (ref 35–45)
HEMOGLOBIN: 13.3 G/DL (ref 11.7–15.5)
LDL-CHOLESTEROL: 97 MG/DL (CALC)
LYMPHOCYTES: 24.6 %
MCH: 31.4 PG (ref 27–33)
MCHC: 31.6 G/DL (ref 32–36)
MCV: 99.3 FL (ref 80–100)
MONOCYTES: 11.1 %
MPV: 10.6 FL (ref 7.5–12.5)
NEUTROPHILS: 60.1 %
NON-HDL CHOLESTEROL: 112 MG/DL (CALC)
PLATELET COUNT: 264 THOUSAND/UL (ref 140–400)
POTASSIUM: 4.5 MMOL/L (ref 3.5–5.3)
PROTEIN, TOTAL: 6.5 G/DL (ref 6.1–8.1)
RDW: 12.8 % (ref 11–15)
RED BLOOD CELL COUNT: 4.24 MILLION/UL (ref 3.8–5.1)
SODIUM: 138 MMOL/L (ref 135–146)
TRIGLYCERIDES: 66 MG/DL
TSH W/REFLEX TO FT4: 1.73 MIU/L (ref 0.4–4.5)
WHITE BLOOD CELL COUNT: 5.2 THOUSAND/UL (ref 3.8–10.8)

## 2025-06-06 ENCOUNTER — OFFICE VISIT (OUTPATIENT)
Dept: INTERNAL MEDICINE CLINIC | Facility: CLINIC | Age: 65
End: 2025-06-06
Payer: COMMERCIAL

## 2025-06-06 VITALS
WEIGHT: 155 LBS | HEART RATE: 67 BPM | RESPIRATION RATE: 16 BRPM | HEIGHT: 64 IN | OXYGEN SATURATION: 98 % | SYSTOLIC BLOOD PRESSURE: 130 MMHG | BODY MASS INDEX: 26.46 KG/M2 | TEMPERATURE: 97 F | DIASTOLIC BLOOD PRESSURE: 80 MMHG

## 2025-06-06 DIAGNOSIS — Z80.0 FAMILY HISTORY OF COLON CANCER: ICD-10-CM

## 2025-06-06 DIAGNOSIS — A60.00 GENITAL HERPES SIMPLEX, UNSPECIFIED SITE: ICD-10-CM

## 2025-06-06 DIAGNOSIS — Z87.891 HISTORY OF CIGARETTE SMOKING: ICD-10-CM

## 2025-06-06 DIAGNOSIS — I34.0 NONRHEUMATIC MITRAL VALVE REGURGITATION: ICD-10-CM

## 2025-06-06 DIAGNOSIS — M79.604 RIGHT LEG PAIN: ICD-10-CM

## 2025-06-06 DIAGNOSIS — M85.89 OSTEOPENIA OF MULTIPLE SITES: ICD-10-CM

## 2025-06-06 DIAGNOSIS — I36.1 NONRHEUMATIC TRICUSPID VALVE REGURGITATION: ICD-10-CM

## 2025-06-06 DIAGNOSIS — Z85.3 HISTORY OF BREAST CANCER: ICD-10-CM

## 2025-06-06 DIAGNOSIS — Z00.00 WELLNESS EXAMINATION: Primary | ICD-10-CM

## 2025-06-06 DIAGNOSIS — K63.5 POLYP OF COLON, UNSPECIFIED PART OF COLON, UNSPECIFIED TYPE: ICD-10-CM

## 2025-06-06 PROBLEM — M85.80 OSTEOPENIA, UNSPECIFIED LOCATION: Status: RESOLVED | Noted: 2020-10-26 | Resolved: 2025-06-06

## 2025-06-06 PROCEDURE — 99396 PREV VISIT EST AGE 40-64: CPT | Performed by: INTERNAL MEDICINE

## 2025-06-06 RX ORDER — ANASTROZOLE 1 MG/1
TABLET ORAL
COMMUNITY
Start: 2024-11-11

## 2025-06-06 RX ORDER — VALACYCLOVIR HYDROCHLORIDE 1 G/1
1 TABLET, FILM COATED ORAL AS NEEDED
COMMUNITY
Start: 2023-11-14 | End: 2025-06-06

## 2025-06-06 RX ORDER — VALACYCLOVIR HYDROCHLORIDE 1 G/1
TABLET, FILM COATED ORAL
Qty: 15 TABLET | Refills: 1 | Status: SHIPPED | OUTPATIENT
Start: 2025-06-06

## 2025-06-06 NOTE — PROGRESS NOTES
Magdalena Rose  3/9/1960    Chief Complaint   Patient presents with    Physical     Rm 9 SS       SUBJECTIVE   Magdalena Rose is a 65 year old female who presents for annual physical examination.    She was wondering if Anastrozole was affecting her labs   And was assured that her blood work was unremarkable including CBC, TSH, lipid and CMP.    For the last month or so she has had pain in her right leg.  Located on the she denies any trauma.  She thought  anterolateral aspect of the thigh and lateral aspect of the leg.  The pain was due to hardwood floors at work.  The patient retired 2 days ago so was going to monitor her pain for now.    The patient is interested in lung cancer screening.  She quit about 10 years ago.  She smoked for 30 years about 1/2 to 1 packs/day.    Review of Systems   Review of Systems   No f/c/chest pain or sob. No cough. No abd pain/n/v/d. No ha or dizziness. No numbness, tingling, or weakness.     OBJECTIVE:   /80   Pulse 67   Temp 96.8 °F (36 °C) (Temporal)   Resp 16   Ht 5' 4\" (1.626 m)   Wt 155 lb (70.3 kg)   LMP 08/05/2013   SpO2 98%   BMI 26.61 kg/m²   Physical Exam   Constitutional: Oriented to person, place, and time. No distress.   HEENT:  Normocephalic and atraumatic.Tympanic membranes appear normal. Oropharynx is clear and moist.   Eyes: Conjunctivae not injected.  Extraocular movements are intact  Neck: Full ROM.  Neck supple.  No thyromegaly  Cardiovascular: Normal rate, regular rhythm and intact distal pulses.  No murmur, rubs or gallops.   Pulmonary/Chest: Effort normal and breath sounds normal. No respiratory distress.  Abdominal: Soft. Bowel sounds are normal. Non tender, no masses, no organomegaly or hernias.  Musculoskeletal: No edema in bilateral lower extremities.  Strength is 5/5 in bilateral upper and lower extremities.  No tenderness of bilateral hip joints.  Mild crepitus in the left knee.  Full range of motion of bilateral knees and  hips.  Lymphadenopathy: No cervical adenopathy.   Neurological: No focal neurologic deficits.  Cranial nerves II through XII are intact.    Skin: Skin is warm and dry. No rash on visualized skin.  Psychiatric: Normal mood and affect.       Lab Results   Component Value Date    GLU 99 06/02/2025    BUN 12 06/02/2025    CREATSERUM 0.84 06/02/2025    BUNCREA SEE NOTE: 06/02/2025    CA 9.2 06/02/2025     06/02/2025    K 4.5 06/02/2025     06/02/2025    CO2 28 06/02/2025      Lab Results   Component Value Date    WBC 5.2 06/02/2025    RBC 4.24 06/02/2025    HGB 13.3 06/02/2025    HCT 42.1 06/02/2025    MCV 99.3 06/02/2025    MCH 31.4 06/02/2025    MCHC 31.6 (L) 06/02/2025    RDW 12.8 06/02/2025     06/02/2025      Lab Results   Component Value Date    TSH 1.590 07/22/2016    TSHT4 1.73 06/02/2025        ASSESSMENT AND PLAN:   1. Wellness examination    2. Nonrheumatic tricuspid valve regurgitation  Reviewed prior echocardiogram from 2022. There is evidence of mild MR and TR. no murmurs on examination and the patient is otherwise asymptomatic.  We discussed repeating echocardiogram in the next 1 to 2 years.    3. Nonrheumatic mitral valve regurgitation  See above    4. Right leg pain  Patient with pain in her right leg.  It is mostly appreciated on the anterior aspect of the thigh and lateral aspect of the leg.  Normal physical examination today.  Patient is in physical therapy for left upper extremity pain secondary to breast surgery.  Instructed patient to inquire with physical therapist if the right lower extremity can be added.  If not can consider additional physical therapy.  If pain does not resolve or worsens will obtain imaging of the leg.    5. Genital herpes simplex, unspecified site  - valACYclovir 1 G Oral Tab; Take 1,000 mg once daily for 5 days with 3 refills (dispense 15 tabs)  Dispense: 15 tablet; Refill: 1    6. History of cigarette smoking  - CT LUNG LD SCREENING(CPT=71271);  Future    7. History of breast cancer  The patient was diagnosed with stage I ER positive HER2 negative breast cancer. She completed a lumpectomy and radiation and is now on anastrozole therapy. Management per [facility] surgical oncology.    8. Family history of colon cancer  Continue regular colonoscopies    9. Osteopenia of multiple sites  Last DEXA scan in 2024 so we will repeat next year. Continue calcium/vitamin D supplementation.    10. Polyp of colon, unspecified part of colon, unspecified type  Continue regular colonoscopies, recall per GI.    TODAY'S ORDERS     No orders of the defined types were placed in this encounter.      Meds & Refills:  Requested Prescriptions     Signed Prescriptions Disp Refills    valACYclovir 1 G Oral Tab 15 tablet 1     Sig: Take 1,000 mg once daily for 5 days with 3 refills (dispense 15 tabs)       Imaging & Consults:  XR HIP W OR WO PELVIS 2 OR 3 VIEWS, RIGHT (CPT=73502)  CT LUNG LD SCREENING(CPT=71271)    No follow-ups on file.  There are no Patient Instructions on file for this visit.    All questions were answered and the patient agrees with the plan.     Thank you,  Maria Teresa Aly, DO

## 2025-06-16 RX ORDER — ANASTROZOLE 1 MG/1
TABLET ORAL
Refills: 0 | OUTPATIENT
Start: 2025-06-16

## 2025-06-16 NOTE — TELEPHONE ENCOUNTER
Per Last Office Visit: History of breast cancer  The patient was diagnosed with stage I ER positive HER2 negative breast cancer. She completed a lumpectomy and radiation and is now on anastrozole therapy. Management per [facility] surgical oncology.    Dispense history :     06/12/2025 04/11/2025 12/26/2024  LAST WRITTEN: 11/11/2024  LAST WRITTEN: [facility].

## 2025-06-24 ENCOUNTER — HOSPITAL ENCOUNTER (OUTPATIENT)
Dept: GENERAL RADIOLOGY | Age: 65
Discharge: HOME | End: 2025-06-24
Attending: INTERNAL MEDICINE
Payer: COMMERCIAL

## 2025-06-24 DIAGNOSIS — M79.604 RIGHT LEG PAIN: ICD-10-CM

## 2025-06-24 PROCEDURE — 73502 X-RAY EXAM HIP UNI 2-3 VIEWS: CPT | Performed by: INTERNAL MEDICINE

## 2025-06-24 PROCEDURE — 73552 X-RAY EXAM OF FEMUR 2/>: CPT | Performed by: INTERNAL MEDICINE

## 2025-07-15 ENCOUNTER — TELEPHONE (OUTPATIENT)
Dept: INTERNAL MEDICINE CLINIC | Facility: CLINIC | Age: 65
End: 2025-07-15

## 2025-07-15 NOTE — TELEPHONE ENCOUNTER
Rcvd skin exam notes from Sasha Porras done by Dr. Letty Baez from 06/26/2025. Placed in MP bin for review.